# Patient Record
Sex: MALE | Employment: OTHER | URBAN - METROPOLITAN AREA
[De-identification: names, ages, dates, MRNs, and addresses within clinical notes are randomized per-mention and may not be internally consistent; named-entity substitution may affect disease eponyms.]

---

## 2018-09-27 ENCOUNTER — HOSPITAL ENCOUNTER (EMERGENCY)
Facility: CLINIC | Age: 33
Discharge: HOME | End: 2018-09-27
Admitting: EMERGENCY MEDICINE

## 2018-09-27 VITALS
RESPIRATION RATE: 16 BRPM | OXYGEN SATURATION: 98 % | DIASTOLIC BLOOD PRESSURE: 68 MMHG | TEMPERATURE: 98.2 F | SYSTOLIC BLOOD PRESSURE: 146 MMHG

## 2018-09-27 DIAGNOSIS — L03.319 CELLULITIS AND ABSCESS OF TRUNK: ICD-10-CM

## 2018-09-27 DIAGNOSIS — L02.219 CELLULITIS AND ABSCESS OF TRUNK: ICD-10-CM

## 2018-09-27 PROCEDURE — 99283 EMERGENCY DEPT VISIT LOW MDM: CPT

## 2018-09-27 PROCEDURE — 10060 I&D ABSCESS SIMPLE/SINGLE: CPT

## 2018-09-27 RX ORDER — CEPHALEXIN 500 MG/1
500 CAPSULE ORAL 4 TIMES DAILY
Qty: 28 CAPSULE | Refills: 0 | Status: SHIPPED | OUTPATIENT
Start: 2018-09-27 | End: 2018-10-04

## 2018-09-27 ASSESSMENT — ENCOUNTER SYMPTOMS: CHILLS: 0

## 2018-09-27 NOTE — ED PROVIDER NOTES
History     Chief Complaint:  Wound Check     HPI   Rl Epstein is a 33 year old male who presents alone to the Emergency Department for evaluation of wound check. The patient reports that he observed a left groin abscess 2 days prior to arrival. He notes that he squeezed the area, as it was quite small at first. He observed yellow colored liquid being drained. Since he tried draining the abscess, the affected area has increased in size and is painful to the touch. He admits to shaving his pubic hair occasionally and has done this recently. He has a history of abscess with the last one occurring near the rectum. This did not require medical intervention and went away on its own. He denies any chill, penile or scrotal abnormalities, or other complaints. No concern for STIs. No IV drug use or known exposure to MRSA.     Allergies:  No Known Drug Allergies     Medications:    The patient is currently on no regular medications.    Past Medical History:    History reviewed. No pertinent past medical history.    Past Surgical History:    Eye Surgery     Family History:    History reviewed. No pertinent family history.      Social History:  The patient presents alone  Smoking Status: Current Every Day Smoker. 0.25 packs daily   Smokeless Tobacco: Never  Alcohol Use: Yes-socially      Review of Systems   Constitutional: Negative for chills.   Genitourinary: Negative for discharge, penile pain, penile swelling, scrotal swelling and testicular pain.   Skin:        Painful wound localized to left groin    All other systems reviewed and are negative.    Physical Exam   First Vitals:  BP: 146/68  Heart Rate: 63  Temp: 98.2  F (36.8  C)  Resp: 16  SpO2: 98 %    Physical Exam  General: Alert and interactive. Appears well. Cooperative and pleasant.   Eyes: The pupils are equal and round. EOMs intact. No scleral icterus.  ENT: No abnormalities to the external nose or ears. Mucous membranes moist. Posterior oropharynx is  non-erythematous.      Neck: Trachea is in the midline. No nuchal rigidity.     CV: Regular rate and rhythm. S1 and S2 normal without murmur, click, gallop or rub.   Resp: Breath sounds are clear bilaterally, without rhonchi, wheezes, rales. Non-labored, no retractions or accessory muscle use.     GI: Abdomen is soft without distension. No tenderness to palpation. No peritoneal signs.    MS: Moving all extremities well. Good muscle tone.   Skin: In the left inguinal region, there is a 3 cm x 2 cm area of painful induration. There is a smaller 1 cm x 1 cm softer area near the lateral aspect of the wound that comes to head. This is consistent with abscess. No surrounding rashes or lesions to suggest folliculitis. There is a small amount of surrounding erythema with warmth.   Neuro: Alert and oriented x 3. No focal neurologic deficits. Good strength and sensation in upper and lower extremities.    Psych: Awake. Alert.  Normal affect. Appropriate interactions.  Lymph: No anterior or posterior cervical lymphadenopathy noted.    Emergency Department Course     Procedure: Incision and Drainage    Performed by: Salena Reis PA-C  LOCATION: Left groin, pubic region  ANESTHESIA: Local field block using Bupivacaine 0.5% without epinephrine using 27 gauge needle. 8 mls injected.   PROCEDURE: Verbal consent obtained from patient after discussing the risks/benefits/alternatives. The area was cleaned with betadine. The area was then incised with # 11 Blade in a single straight incision, and there was successful purulent and blood clot drainage of abscess. No Packing.   Patient Status: Patient tolerated the procedure moderately well. There were no complications evident.   Emergency Department Course:  Nursing notes and vitals reviewed.  1744: I performed an exam of the patient as documented above.   I performed the above procedure.   Findings and plan explained to the Patient. Patient discharged home with instructions regarding  supportive care, medications, and reasons to return. The importance of close follow-up was reviewed. The patient was prescribed Keflex    Impression & Plan      Medical Decision Making:  Rl Epstein is a 33 year old male who presents for evaluation of swelling and tenderness concerning for abscess. On examination, this is consistent with abscess. There was successful drainage of purulent material, but the induration remained. There are some signs of localized warmth and erythema to suggest a localized cellulitis but no signs of folliculitis. I also discussed the possibility of lymphadenopathy, but the patient has no concerns for STI, and given successful drainage of purulent material and history, I feel this is most consistent with an abscess. I discussed warm soaks and Keflex for localized cellulitis. Patient has no risk factors for MRSA, and he actually feels much improved after the I&D procedure. He should follow up with PCP in 2-3 days for wound recheck. I provided information for general surgery should the abscess remain and need further surgical management. Return with worsening wound redness, swelling, or fever/chills.     Diagnosis:    ICD-10-CM    1. Cellulitis and abscess of trunk L03.319     L02.219      Disposition: Discharged to home    Discharge Medications:  Discharge Medication List as of 9/27/2018  6:44 PM      START taking these medications    Details   cephALEXin (KEFLEX) 500 MG capsule Take 1 capsule (500 mg) by mouth 4 times daily for 7 days, Disp-28 capsule, R-0, Local Print           Rossi Terry  9/27/2018    EMERGENCY DEPARTMENT    Scribe Disclosure:  IRossi, am serving as a scribe at 5:44 PM on 9/27/2018 to document services personally performed by Salena Reis PA-C based on my observations and the provider's statements to me.       Salena Reis PA-C  09/27/18 2042

## 2018-09-27 NOTE — ED AVS SNAPSHOT
Emergency Department    [address] [facility] [address]    Phone:  [phone]    Fax:  [phone]                                       Rl Epstein   MRN: [id number]    Department:   Emergency Department   Date of Visit:  9/27/2018           Patient Information     Date Of Birth          1985        Your diagnoses for this visit were:     Cellulitis and abscess of trunk       Follow-up Information     Follow up with Paul Alejandra MD In 2 days.    Specialty:  Internal Medicine    Why:  For wound re-check    Contact information:    [address]  [facility] [id number] [phone]          Follow up with  Emergency Department.    Specialty:  EMERGENCY MEDICINE    Why:  If symptoms worsen - worsening redness, swelling, fever or chills     Contact information:    [address] [facility] [address] [phone]        Follow up with [facility]    Why:  As needed, If symptoms persist beyond antibiotics and warm compresses     Contact information:    [address]  [facility] [address] [phone]        Discharge Instructions         Abscess (Incision & Drainage)  An abscess is sometimes called a boil. It happens when bacteria get trapped under the skin and start to grow. Pus forms inside the abscess as the body responds to the bacteria. An abscess can happen with an insect bite, ingrown hair, blocked oil gland, pimple, cyst, or puncture wound.  Your healthcare provider has drained the pus from your abscess. If the abscess pocket was large, your healthcare provider may have put in gauze packing. Your provider will need to remove it on your next visit. He or she may also replace it at that time. You may not need antibiotics to treat a simple abscess, unless the infection is spreading into the skin around the wound (cellulitis).  The wound will take about 1 to 2 weeks to heal, depending on the size of the abscess.  Healthy tissue will grow from the bottom and sides of the opening until it seals over.  Home care  These tips can help your wound heal:    The wound may drain for the first 2 days. Cover the wound with a clean dry dressing. Change the dressing if it becomes soaked with blood or pus.    If a gauze packing was placed inside the abscess pocket, you may be told to remove it yourself. You may do this in the shower. Once the packing is removed, you should wash the area in the shower, or clean the area as directed by your provider. Continue to do this until the skin opening has closed. Make sure you wash your hands after changing the packing or cleaning the wound.    If you were prescribed antibiotics, take them as directed until they are all gone.    You may use acetaminophen or ibuprofen to control pain, unless another pain medicine was prescribed. If you have liver disease or ever had a stomach ulcer, talk with your doctor before using these medicines.  Follow-up care  Follow up with your healthcare provider, or as advised. If a gauze packing was put in your wound, it should be removed in 1 to 2 days. Check your wound every day for any signs that the infection is getting worse. The signs are listed below.  When to seek medical advice  Call your healthcare provider right away if any of these occur:    Increasing redness or swelling    Red streaks in the skin leading away from the wound    Increasing local pain or swelling    Continued pus draining from the wound 2 days after treatment    Fever of 100.4 F (38 C) or higher, or as directed by your healthcare provider    Boil returns when you are at home  Date Last Reviewed: 9/1/2016 2000-2017 [employer]. [address] [place] [address]. All rights reserved. This information is not intended as a substitute for professional medical care. Always follow your healthcare professional's instructions.          24 Hour Appointment Hotline       To make an  appointment at any [facility] (formerly [facility]), call [phone] ([phone]). If you don't have a family doctor or clinic, we will help you find one. [facility] are conveniently located to serve the needs of you and your family.             Review of your medicines      START taking        Dose / Directions Last dose taken    cephALEXin 500 MG capsule   Commonly known as:  KEFLEX   Dose:  500 mg   Quantity:  28 capsule        Take 1 capsule (500 mg) by mouth 4 times daily for 7 days   Refills:  0                Prescriptions were sent or printed at these locations (1 Prescription)                   Other Prescriptions                Printed at Department/Unit printer (1 of 1)         cephALEXin (KEFLEX) 500 MG capsule                Orders Needing Specimen Collection     None      Pending Results     No orders found from 9/25/2018 to 9/28/2018.            Pending Culture Results     No orders found from 9/25/2018 to 9/28/2018.            Pending Results Instructions     If you had any lab results that were not finalized at the time of your Discharge, you can call the ED Lab Result RN at [phone]. You will be contacted by this team for any positive Lab results or changes in treatment. The nurses are available 7 days a week from 10A to 6:30P.  You can leave a message 24 hours per day and they will return your call.        Test Results From Your Hospital Stay               Clinical Quality Measure: Blood Pressure Screening     Your blood pressure was checked while you were in the emergency department today. The last reading we obtained was  BP: 146/68 . Please read the guidelines below about what these numbers mean and what you should do about them.  If your systolic blood pressure (the top number) is less than 120 and your diastolic blood pressure (the bottom number) is less than 80, then your blood pressure is normal. There is nothing more that you need to do about it.  If your systolic blood pressure (the top  "number) is 120-139 or your diastolic blood pressure (the bottom number) is 80-89, your blood pressure may be higher than it should be. You should have your blood pressure rechecked within a year by a primary care provider.  If your systolic blood pressure (the top number) is 140 or greater or your diastolic blood pressure (the bottom number) is 90 or greater, you may have high blood pressure. High blood pressure is treatable, but if left untreated over time it can put you at risk for heart attack, stroke, or kidney failure. You should have your blood pressure rechecked by a primary care provider within the next 4 weeks.  If your provider in the emergency department today gave you specific instructions to follow-up with your doctor or provider even sooner than that, you should follow that instruction and not wait for up to 4 weeks for your follow-up visit.        Thank you for choosing [facility]       Thank you for choosing [facility] for your care. Our goal is always to provide you with excellent care. Hearing back from our patients is one way we can continue to improve our services. Please take a few minutes to complete the written survey that you may receive in the mail after you visit with us. Thank you!        [employer] Information     [employer] lets you send messages to your doctor, view your test results, renew your prescriptions, schedule appointments and more. To sign up, go to [email or web address] . Click on \"Log in\" on the left side of the screen, which will take you to the Welcome page. Then click on \"Sign up Now\" on the right side of the page.     You will be asked to enter the access code listed below, as well as some personal information. Please follow the directions to create your username and password.     Your access code is: [email or web address]  Expires: 2018  6:43 PM     Your access code will  in 90 days. If you need help or a new code, please call your [facility] clinic or [phone].      "   Care EveryWhere ID     This is your Care EveryWhere ID. This could be used by other organizations to access your [facility] medical records  [id number]        Equal Access to Services     GLORY BURTON : Koki Arias, octavio bagley, jay jay landon, jus booth. So [facility] [phone].    ATENCIÓN: Si habla español, tiene a duarte disposición servicios gratuitos de asistencia lingüística. Llame al [phone].    We comply with applicable federal civil rights laws and [place] laws. We do not discriminate on the basis of race, color, national origin, age, disability, sex, sexual orientation, or gender identity.            After Visit Summary       This is your record. Keep this with you and show to your community pharmacist(s) and doctor(s) at your next visit.

## 2018-09-27 NOTE — ED AVS SNAPSHOT
Emergency Department    [address] [place] [address]    Phone:  [phone]    Fax:  [phone]                                       Rl Epstein   MRN: [id number]    Department:   Emergency Department   Date of Visit:  9/27/2018           After Visit Summary Signature Page     I have received my discharge instructions, and my questions have been answered. I have discussed any challenges I see with this plan with the nurse or doctor.    ..........................................................................................................................................  Patient/Patient Representative Signature      ..........................................................................................................................................  Patient Representative Print Name and Relationship to Patient    ..................................................               ................................................  Date                                   Time    ..........................................................................................................................................  Reviewed by Signature/Title    ...................................................              ..............................................  Date                                               Time          22EPIC Rev 08/18